# Patient Record
(demographics unavailable — no encounter records)

---

## 2024-12-03 NOTE — PHYSICAL EXAM
[de-identified] : L wrist: Swelling Tender TFCC, SL, voalr wrist Pain with pronation/supination ROM 40/40 +TFCC grind  Xrays neg for fracture

## 2024-12-03 NOTE — ASSESSMENT
[FreeTextEntry1] : Injection did not help much and raised sugars I would avoid I rec cont with therapy Hold off on surgery  Return in 4-6 weeks

## 2024-12-03 NOTE — HISTORY OF PRESENT ILLNESS
[Work related] : work related [9] : 9 [5] : 5 [Dull/Aching] : dull/aching [] : yes [de-identified] : WC 1/16/24 He is getting worse without therapy Therapy was helping   Injection last visit only helped slightly  [Part time] : Work status: part time [FreeTextEntry1] : L wrist [FreeTextEntry3] : 1/16/24 [de-identified] : inj

## 2025-02-25 NOTE — PHYSICAL EXAM
[de-identified] : L wrist: Swelling Tender TFCC, SL, voalr wrist Pain with pronation/supination ROM 40/40 +TFCC grind

## 2025-02-25 NOTE — HISTORY OF PRESENT ILLNESS
[Work related] : work related [7] : 7 [4] : 4 [Dull/Aching] : dull/aching [] : yes [de-identified] : WC 1/16/24 L wrist TFCC tear  Therapy is not getting approved and he is getting worse  [FreeTextEntry3] : 1/16/2024

## 2025-02-25 NOTE — REASON FOR VISIT
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 reports no changes since last visit.

## 2025-02-25 NOTE — ASSESSMENT
[FreeTextEntry1] : TFCC tear is an acute complicated injury. Treatment options include OTC NSAIDS, prescription NSAIDs, ice, OT, cortisone injection, PRP injection, and surgery. These tears typically do not get better without surgery.  I prescribed OT 2-3 times a week for 4-6 weeks   I recommend the patient take over the counter medication such as Advil/Motrin/Aleve or Tylenol for pain and inflammation  Return 4-6 weeks

## 2025-04-29 NOTE — REASON FOR VISIT
[FreeTextEntry2] :  04/29/2025:  65 year old male here today for: wc f/u L wrist pain and Complex tear of triangular fibrocartilage

## 2025-04-29 NOTE — PHYSICAL EXAM
[de-identified] : L wrist: Swelling Tender TFCC, SL, volar wrist Tender thumb CMC  Stiffness with thumb opposition  Pain with pronation/supination ROM 30/30 +TFCC grind

## 2025-04-29 NOTE — ASSESSMENT
[FreeTextEntry1] : TFCC tear is an acute complicated injury. Treatment options include OTC NSAIDS, prescription NSAIDs, ice, OT, cortisone injection, PRP injection, and surgery. These tears typically do not get better without surgery.  I prescribed OT 2-3 times a week for 4-6 weeks   I recommend the patient take over the counter medication such as Advil/Motrin/Aleve or Tylenol for pain and inflammation  Return in 4 weeks

## 2025-04-29 NOTE — HISTORY OF PRESENT ILLNESS
[Work related] : work related [7] : 7 [4] : 4 [Dull/Aching] : dull/aching [] : yes [de-identified] : WC 1/16/24 L wrist TFCC tear He has not been approved for therapy  He is getting worse  [FreeTextEntry1] : L wrist  [FreeTextEntry3] : 1/16/24

## 2025-06-03 NOTE — PHYSICAL EXAM
[de-identified] : L wrist: Swelling Tender TFCC, SL, volar wrist Tender thumb CMC  Stiffness with thumb opposition  Pain with pronation/supination ROM 30/30 +TFCC grind

## 2025-06-03 NOTE — ASSESSMENT
[FreeTextEntry1] : TFCC tear is an acute complicated injury. Treatment options include OTC NSAIDS, prescription NSAIDs, ice, OT, cortisone injection, PRP injection, and surgery. These tears typically do not get better without surgery.  I prescribed PT 2-3 times a week for 4-6 weeks Return in 4 weeks

## 2025-06-03 NOTE — REASON FOR VISIT
[FreeTextEntry2] :  06/03/2025:  65 year old male here today for: wc f/u L wrist pain and Complex tear of triangular fibrocartilage.

## 2025-06-03 NOTE — HISTORY OF PRESENT ILLNESS
[Work related] : work related [7] : 7 [4] : 4 [Dull/Aching] : dull/aching [] : yes [de-identified] : WC 1/16/24 L wrist is the same  HE has not been to therapy due to script [Part time] : Work status: part time [FreeTextEntry1] : L wrist  [FreeTextEntry3] : 1/16/24

## 2025-07-01 NOTE — PHYSICAL EXAM
[de-identified] : L wrist: Swelling Tender TFCC, SL, volar wrist Tender thumb CMC  Stiffness with thumb opposition  Pain with pronation/supination ROM 20/20 wrist flex/extension +TFCC grind

## 2025-07-01 NOTE — ASSESSMENT
[FreeTextEntry1] : TFCC tear is an acute complicated injury. Treatment options include OTC NSAIDS, prescription NSAIDs, ice, OT, cortisone injection, PRP injection, and surgery. These tears typically do not get better without surgery.  I prescribed PT 2-3 times a week for 4-6 weeks He requires therapy in order to get better, if not he will require surgery to get better. Return in 4 weeks.

## 2025-07-01 NOTE — HISTORY OF PRESENT ILLNESS
[Work related] : work related [7] : 7 [4] : 4 [Dull/Aching] : dull/aching [Part time] : Work status: part time [] : yes [de-identified] : WC 1/16/24 He is getting worse WC is not approving therapy  [FreeTextEntry1] : L wrist  [FreeTextEntry3] : 1/16/24

## 2025-07-01 NOTE — REASON FOR VISIT
[FreeTextEntry2] :  07/01/2025:  65 year old male here today for: wc f/u L wrist pain and Complex tear of triangular fibrocartilage.